# Patient Record
Sex: FEMALE
[De-identification: names, ages, dates, MRNs, and addresses within clinical notes are randomized per-mention and may not be internally consistent; named-entity substitution may affect disease eponyms.]

---

## 2017-02-22 ENCOUNTER — CHARTING TRANS (OUTPATIENT)
Dept: OTHER | Age: 62
End: 2017-02-22

## 2017-06-06 ENCOUNTER — APPOINTMENT (OUTPATIENT)
Dept: CT IMAGING | Facility: HOSPITAL | Age: 62
End: 2017-06-06
Attending: EMERGENCY MEDICINE
Payer: COMMERCIAL

## 2017-06-06 ENCOUNTER — HOSPITAL ENCOUNTER (EMERGENCY)
Facility: HOSPITAL | Age: 62
Discharge: HOME | End: 2017-06-06
Attending: EMERGENCY MEDICINE
Payer: COMMERCIAL

## 2017-06-06 VITALS
WEIGHT: 158 LBS | OXYGEN SATURATION: 96 % | SYSTOLIC BLOOD PRESSURE: 121 MMHG | BODY MASS INDEX: 29.08 KG/M2 | TEMPERATURE: 100 F | HEART RATE: 93 BPM | RESPIRATION RATE: 18 BRPM | DIASTOLIC BLOOD PRESSURE: 69 MMHG | HEIGHT: 62 IN

## 2017-06-06 DIAGNOSIS — K52.9 GASTROENTERITIS: Primary | ICD-10-CM

## 2017-06-06 DIAGNOSIS — N30.00 ACUTE CYSTITIS WITHOUT HEMATURIA: ICD-10-CM

## 2017-06-06 DIAGNOSIS — K42.9 UMBILICAL HERNIA WITHOUT OBSTRUCTION AND WITHOUT GANGRENE: ICD-10-CM

## 2017-06-06 PROCEDURE — 87045 FECES CULTURE AEROBIC BACT: CPT | Performed by: EMERGENCY MEDICINE

## 2017-06-06 PROCEDURE — 87086 URINE CULTURE/COLONY COUNT: CPT | Performed by: EMERGENCY MEDICINE

## 2017-06-06 PROCEDURE — 80053 COMPREHEN METABOLIC PANEL: CPT | Performed by: EMERGENCY MEDICINE

## 2017-06-06 PROCEDURE — 99284 EMERGENCY DEPT VISIT MOD MDM: CPT

## 2017-06-06 PROCEDURE — 81001 URINALYSIS AUTO W/SCOPE: CPT | Performed by: EMERGENCY MEDICINE

## 2017-06-06 PROCEDURE — 96361 HYDRATE IV INFUSION ADD-ON: CPT

## 2017-06-06 PROCEDURE — 87077 CULTURE AEROBIC IDENTIFY: CPT | Performed by: EMERGENCY MEDICINE

## 2017-06-06 PROCEDURE — 82272 OCCULT BLD FECES 1-3 TESTS: CPT | Performed by: EMERGENCY MEDICINE

## 2017-06-06 PROCEDURE — 87493 C DIFF AMPLIFIED PROBE: CPT | Performed by: EMERGENCY MEDICINE

## 2017-06-06 PROCEDURE — 87046 STOOL CULTR AEROBIC BACT EA: CPT | Performed by: EMERGENCY MEDICINE

## 2017-06-06 PROCEDURE — 87427 SHIGA-LIKE TOXIN AG IA: CPT | Performed by: EMERGENCY MEDICINE

## 2017-06-06 PROCEDURE — 85025 COMPLETE CBC W/AUTO DIFF WBC: CPT | Performed by: EMERGENCY MEDICINE

## 2017-06-06 PROCEDURE — 87186 SC STD MICRODIL/AGAR DIL: CPT | Performed by: EMERGENCY MEDICINE

## 2017-06-06 PROCEDURE — 96374 THER/PROPH/DIAG INJ IV PUSH: CPT

## 2017-06-06 PROCEDURE — 74176 CT ABD & PELVIS W/O CONTRAST: CPT | Performed by: EMERGENCY MEDICINE

## 2017-06-06 PROCEDURE — 87015 SPECIMEN INFECT AGNT CONCNTJ: CPT | Performed by: EMERGENCY MEDICINE

## 2017-06-06 PROCEDURE — 83690 ASSAY OF LIPASE: CPT | Performed by: EMERGENCY MEDICINE

## 2017-06-06 RX ORDER — CIPROFLOXACIN 500 MG/1
500 TABLET, FILM COATED ORAL 2 TIMES DAILY
Qty: 14 TABLET | Refills: 0 | Status: SHIPPED | OUTPATIENT
Start: 2017-06-06 | End: 2017-06-13

## 2017-06-06 RX ORDER — ONDANSETRON 2 MG/ML
4 INJECTION INTRAMUSCULAR; INTRAVENOUS ONCE
Status: COMPLETED | OUTPATIENT
Start: 2017-06-06 | End: 2017-06-06

## 2017-06-06 NOTE — ED PROVIDER NOTES
Patient Seen in: [facility] Emergency Department    History   Patient presents with:  Nausea/Vomiting/Diarrhea (gastrointestinal)    Stated Complaint: nvd hx stomach cancer    HPI    52-year-old female comes the hospital that she complained of havin Fluticasone Propionate (FLONASE) 50 MCG/ACT Nasal Suspension,  by Each Nare route daily.    omeprazole (PRILOSEC) 20 MG Oral Capsule Delayed Release,  Take 1 capsule by mouth every morning before breakfast.   Calcium Carbonate (CALCIUM 500 OR),  Take  by mo 96%        Physical Exam    HEENT : NCAT, EOMI, PEERL, throat clear, neck supple, no JVD, trachea midline, No LAD  Heart: S1S2 normal. No murmurs, regular rate and rhythm  Lungs: Clear to auscultation bilaterally  Abdomen: Soft nontender nondistended neda created for panel order STOOL CULTURE W/SHIGATOXIN.   Procedure                               Abnormality         Status                     ---------                               -----------         ------                     STOOL CULTURE(P)[[id number]] focal lesion.    BILIARY:  Normal.  No visible dilatation or calcification.    PANCREAS:  Normal.  No lesion, fluid collection, ductal dilatation, or atrophy.    SPLEEN:  Normal.  No enlargement or focal lesion.    KIDNEYS:  Normal.  No mass, obstruction, [address] [address] [phone]    Schedule an appointment as soon as possible for a visit in 2 days      Layne Heimlich, MD  [address] [place], [place] [id number]            Medications Prescribed:  Discharge M

## 2017-06-07 ENCOUNTER — HOSPITAL ENCOUNTER (OUTPATIENT)
Facility: HOSPITAL | Age: 62
Setting detail: OBSERVATION
Discharge: HOME | End: 2017-06-09
Attending: EMERGENCY MEDICINE | Admitting: HOSPITALIST
Payer: COMMERCIAL

## 2017-06-07 ENCOUNTER — APPOINTMENT (OUTPATIENT)
Dept: GENERAL RADIOLOGY | Facility: HOSPITAL | Age: 62
End: 2017-06-07
Attending: EMERGENCY MEDICINE
Payer: COMMERCIAL

## 2017-06-07 ENCOUNTER — APPOINTMENT (OUTPATIENT)
Dept: NUCLEAR MEDICINE | Facility: HOSPITAL | Age: 62
End: 2017-06-07
Attending: EMERGENCY MEDICINE
Payer: COMMERCIAL

## 2017-06-07 DIAGNOSIS — R19.7 DIARRHEA, UNSPECIFIED TYPE: Primary | ICD-10-CM

## 2017-06-07 DIAGNOSIS — E87.6 HYPOKALEMIA: ICD-10-CM

## 2017-06-07 DIAGNOSIS — R53.1 WEAKNESS GENERALIZED: ICD-10-CM

## 2017-06-07 PROBLEM — K52.9 GASTROENTERITIS: Status: ACTIVE | Noted: 2017-06-07

## 2017-06-07 PROBLEM — R73.9 HYPERGLYCEMIA: Status: ACTIVE | Noted: 2017-06-07

## 2017-06-07 PROCEDURE — 74020 XR ABDOMEN, OBSTRUCTIVE SERIES (CPT=74020): CPT | Performed by: EMERGENCY MEDICINE

## 2017-06-07 PROCEDURE — 78582 LUNG VENTILAT&PERFUS IMAGING: CPT | Performed by: EMERGENCY MEDICINE

## 2017-06-07 PROCEDURE — 71020 XR CHEST PA + LAT CHEST (CPT=71020): CPT | Performed by: EMERGENCY MEDICINE

## 2017-06-07 PROCEDURE — 99225 SUBSEQUENT OBSERVATION CARE: CPT | Performed by: HOSPITALIST

## 2017-06-07 RX ORDER — ACETAMINOPHEN 325 MG/1
650 TABLET ORAL EVERY 6 HOURS PRN
Status: DISCONTINUED | OUTPATIENT
Start: 2017-06-07 | End: 2017-06-09

## 2017-06-07 RX ORDER — ONDANSETRON 2 MG/ML
4 INJECTION INTRAMUSCULAR; INTRAVENOUS EVERY 4 HOURS PRN
Status: DISCONTINUED | OUTPATIENT
Start: 2017-06-07 | End: 2017-06-09

## 2017-06-07 RX ORDER — POTASSIUM CHLORIDE 20 MEQ/1
40 TABLET, EXTENDED RELEASE ORAL EVERY 4 HOURS
Status: COMPLETED | OUTPATIENT
Start: 2017-06-07 | End: 2017-06-08

## 2017-06-07 RX ORDER — ENOXAPARIN SODIUM 100 MG/ML
40 INJECTION SUBCUTANEOUS DAILY
Status: DISCONTINUED | OUTPATIENT
Start: 2017-06-07 | End: 2017-06-09

## 2017-06-07 RX ORDER — SODIUM CHLORIDE 9 MG/ML
1000 INJECTION, SOLUTION INTRAVENOUS ONCE
Status: COMPLETED | OUTPATIENT
Start: 2017-06-07 | End: 2017-06-07

## 2017-06-07 RX ORDER — ONDANSETRON 2 MG/ML
4 INJECTION INTRAMUSCULAR; INTRAVENOUS ONCE
Status: COMPLETED | OUTPATIENT
Start: 2017-06-07 | End: 2017-06-07

## 2017-06-07 RX ORDER — ONDANSETRON 2 MG/ML
4 INJECTION INTRAMUSCULAR; INTRAVENOUS EVERY 6 HOURS PRN
Status: DISCONTINUED | OUTPATIENT
Start: 2017-06-07 | End: 2017-06-09

## 2017-06-07 RX ORDER — LOPERAMIDE HYDROCHLORIDE 2 MG/1
2 CAPSULE ORAL 4 TIMES DAILY PRN
Status: DISCONTINUED | OUTPATIENT
Start: 2017-06-07 | End: 2017-06-09

## 2017-06-07 RX ORDER — POTASSIUM CHLORIDE 20 MEQ/1
20 TABLET, EXTENDED RELEASE ORAL ONCE
Status: COMPLETED | OUTPATIENT
Start: 2017-06-07 | End: 2017-06-07

## 2017-06-07 RX ORDER — DICYCLOMINE HYDROCHLORIDE 10 MG/ML
10 INJECTION INTRAMUSCULAR EVERY 6 HOURS PRN
Status: DISCONTINUED | OUTPATIENT
Start: 2017-06-07 | End: 2017-06-09

## 2017-06-07 RX ORDER — SODIUM CHLORIDE 9 MG/ML
INJECTION, SOLUTION INTRAVENOUS CONTINUOUS
Status: DISCONTINUED | OUTPATIENT
Start: 2017-06-07 | End: 2017-06-09

## 2017-06-07 RX ORDER — FLUTICASONE PROPIONATE 50 MCG
1 SPRAY, SUSPENSION (ML) NASAL DAILY
Status: DISCONTINUED | OUTPATIENT
Start: 2017-06-07 | End: 2017-06-09

## 2017-06-07 RX ORDER — DIPHENOXYLATE HYDROCHLORIDE AND ATROPINE SULFATE 2.5; .025 MG/1; MG/1
1 TABLET ORAL 4 TIMES DAILY PRN
Status: DISCONTINUED | OUTPATIENT
Start: 2017-06-07 | End: 2017-06-09

## 2017-06-07 RX ORDER — DICYCLOMINE HYDROCHLORIDE 10 MG/ML
20 INJECTION INTRAMUSCULAR ONCE
Status: COMPLETED | OUTPATIENT
Start: 2017-06-07 | End: 2017-06-07

## 2017-06-07 RX ORDER — LOPERAMIDE HYDROCHLORIDE 2 MG/1
2 CAPSULE ORAL 4 TIMES DAILY PRN
Status: DISCONTINUED | OUTPATIENT
Start: 2017-06-07 | End: 2017-06-07

## 2017-06-07 RX ORDER — SODIUM CHLORIDE 9 MG/ML
INJECTION, SOLUTION INTRAVENOUS CONTINUOUS
Status: ACTIVE | OUTPATIENT
Start: 2017-06-07 | End: 2017-06-07

## 2017-06-07 RX ORDER — METOCLOPRAMIDE HYDROCHLORIDE 5 MG/ML
10 INJECTION INTRAMUSCULAR; INTRAVENOUS EVERY 8 HOURS PRN
Status: DISCONTINUED | OUTPATIENT
Start: 2017-06-07 | End: 2017-06-09

## 2017-06-07 NOTE — PROGRESS NOTES
NURSING ADMISSION NOTE      Patient admitted via Wheelchair  Oriented to room. Safety precautions initiated. Bed in low position. Call light in reach.     Patient alert and oriented x4; maintaining O2 sats >93% on room air; abd soft; c/o diarrhea; pr

## 2017-06-07 NOTE — PLAN OF CARE
NURSING ADMISSION NOTE      Patient admitted via Cart  Oriented to room. Safety precautions initiated. Bed in low position. Call light in reach.     Admit Navigators completed   Report given to Critical access hospital

## 2017-06-07 NOTE — ED PROVIDER NOTES
Patient Seen in: [facility] Emergency Department    History   Patient presents with:  Abdomen/Flank Pain (GI/)  Nausea/Vomiting/Diarrhea (gastrointestinal)    Stated Complaint: ABD PAIN, DIARRHEA    HPI    Patient is a 67-year-old female who prese disorders, unspecified    • Cervicalgia    • Subjective tinnitus    • Other malaise and fatigue    • Mastodynia    • Pain in joint, hand    • Other malaise and fatigue    • Benign paroxysmal positional vertigo    • ASCUS (atypical squamous cells of undeter quit 30 years ago    Alcohol Use: No                 Comment: 4-5 beers 2x/month      Review of Systems    Positive for stated complaint: ABD PAIN, DIARRHEA  Other systems are as noted in HPI. Constitutional and vital signs reviewed.       All other system deficits appreciated. Cranial nerves II through XII are intact. Patient answering all questions appropriately.           ED Course     Labs Reviewed   COMP METABOLIC PANEL (14) - Abnormal; Notable for the following:     Glucose 139 (*)     AST 14 (*)     P ventilation/perfusion scan.       Patient had IV line established no blood work drawn including a CBC, chemistries, BUN/creatinine, and blood sugar all of which were essentially unremarkable except for potassium 3.3 for which the patient was given oral pota

## 2017-06-07 NOTE — H&P
[facility]  History and Physical     Critical access hospital Patient Status:  Observation    1955 MRN [id number]   [facility] 5NW-A Attending Radha Gomez DO   Hosp Day # 0 PCP Ju Stapleton MD     Chief Complaint: Abdominal radha quit smoking. Her smoking use included Cigarettes. She has never used smokeless tobacco. She reports that she does not drink alcohol or use illicit drugs.     Family History:   Family History   Problem Relation Age of Onset   • Breast Cancer Paternal Aunt SpO2 96%  General: No acute distress. Alert and oriented x 3. HEENT: Normocephalic atraumatic. Moist mucous membranes. EOM-I. PERRLA. Anicteric. Neck: No JVD. No carotid bruits. Respiratory: Clear to auscultation bilaterally. No wheezes. No rhonchi.   C

## 2017-06-08 PROCEDURE — 99225 SUBSEQUENT OBSERVATION CARE: CPT | Performed by: HOSPITALIST

## 2017-06-08 RX ORDER — PANTOPRAZOLE SODIUM 40 MG/1
40 TABLET, DELAYED RELEASE ORAL
Status: DISCONTINUED | OUTPATIENT
Start: 2017-06-09 | End: 2017-06-09

## 2017-06-08 NOTE — PROGRESS NOTES
[facility]  Progress Note     Jatin Barnes Patient Status:  Observation    1955 MRN [id number]   [facility] 5NW-A Attending Theodore Figueroa, [address] [facility] [place] Day # 1 PCP Jethro Jarquin MD     Chief Complaint: Diarrhea    S: Patient Propionate  1 spray Each Nare Daily   • enoxaparin  40 mg Subcutaneous Daily   • pantoprazole (PROTONIX) IV push  40 mg Intravenous Q24H   • cefTRIAXone  1 g Intravenous Q24H       ASSESSMENT / PLAN:     1.  Abdominal pain/diarrhea presumed secondary to gas

## 2017-06-08 NOTE — PLAN OF CARE
GASTROINTESTINAL - ADULT    • Maintains or returns to baseline bowel function Not Progressing          GASTROINTESTINAL - ADULT    • Minimal or absence of nausea and vomiting Progressing        METABOLIC/FLUID AND ELECTROLYTES - ADULT    • Electrolytes madalyn

## 2017-06-08 NOTE — CM/SW NOTE
06/08/17 1500   CM/SW Screening   Referral Source Social Work (self-referral);    [facility] staff; Chart review;Nursing rounds   Patient's Mental Status Alert;Oriented   Patient lives with Spouse   Patient Status Prior to Admissio

## 2017-06-08 NOTE — PROGRESS NOTES
[facility] Pharmacy Note: Route Optimization for Pantoprazole (PROTONIX)    Patient is currently on Pantoprazole (PROTONIX) 40 mg IV every 24 hours.    The patient meets the criteria to convert to the oral equivalent as established by the IV to Oral conversion pro

## 2017-06-08 NOTE — PAYOR COMM NOTE
--------------  ADMISSION REVIEW     Payor: [employer]/HMO/POS/EPO  Authorization Number: N/A    Order Requisition      Place in observation Once (Order #[id number]) on 6/7/17         Admit date: 6/7/2017  6:25 AM       Admitting Physician: Mello Willoughby morning and felt like she cannot take a deep breath this morning which she attributed to her abdominal discomfort. Patient denies history of any fever. Patient denies history of any recent travel or recent antibiotic use.   Patient states that she was sco There is no JVD. No meningeal signs or nuchal rigidity appreciated. No stridor. LUNGS: Clear to auscultation bilaterally with no wheeze. There is good equal air entry bilaterally. HEART: Regular rhythm with a mildly tachycardic rate.  Normal S1, S2 no S3, established no blood work drawn including a CBC, chemistries, BUN/creatinine, and blood sugar all of which were essentially unremarkable except for potassium 3.3 for which the patient was given oral potassium here in the emergency room.   Patient liver func No carotid bruits. Respiratory: Clear to auscultation bilaterally. No wheezes. No rhonchi. Cardiovascular: S1, S2. Regular rate and rhythm. No murmurs, rubs or gallops. Chest and Back: No tenderness or deformity.   Abdomen: Soft, mild epigastric tendern Dose Route User    6/7/2017 2110 Given 1 mL Intramuscular (Left Deltoid) Vamsi Salamanca RN      Enoxaparin Sodium (LOVENOX) 40 MG/0.4ML injection 40 mg     Date Action Dose Route User    6/7/2017 1230 Given 40 mg Subcutaneous (Left Lower Abdomen) [facility]

## 2017-06-09 VITALS
BODY MASS INDEX: 29.22 KG/M2 | TEMPERATURE: 99 F | RESPIRATION RATE: 20 BRPM | OXYGEN SATURATION: 92 % | SYSTOLIC BLOOD PRESSURE: 117 MMHG | DIASTOLIC BLOOD PRESSURE: 63 MMHG | WEIGHT: 158.81 LBS | HEIGHT: 62 IN | HEART RATE: 81 BPM

## 2017-06-09 PROCEDURE — 99217 OBSERVATION CARE DISCHARGE: CPT | Performed by: HOSPITALIST

## 2017-06-09 RX ORDER — PANTOPRAZOLE SODIUM 40 MG/1
40 TABLET, DELAYED RELEASE ORAL
Qty: 30 TABLET | Refills: 0 | Status: SHIPPED | OUTPATIENT
Start: 2017-06-09

## 2017-06-09 RX ORDER — LOPERAMIDE HYDROCHLORIDE 2 MG/1
2 CAPSULE ORAL 4 TIMES DAILY PRN
Qty: 30 CAPSULE | Refills: 0 | Status: SHIPPED | OUTPATIENT
Start: 2017-06-09

## 2017-06-09 RX ORDER — DIPHENOXYLATE HYDROCHLORIDE AND ATROPINE SULFATE 2.5; .025 MG/1; MG/1
1 TABLET ORAL 4 TIMES DAILY PRN
Qty: 30 TABLET | Refills: 0 | Status: SHIPPED | OUTPATIENT
Start: 2017-06-09

## 2017-06-09 NOTE — DISCHARGE SUMMARY
[facility] [place] [facility]  DISCHARGE SUMMARY     Chaparro Schroeder Patient Status:  Observation    1955 MRN [id number]   [facility] 5NW-A Attending Elizabeth Benoit, [address] [facility] Day # 2 PCP Marisa Jaeger MD     Date of Admission: 2017  Date o treated for UTI with rocephin which also covered aeromonas. Patient clinically improved with IVF and anti-diarrheals. Patient to continue cipro at home x 3 more days. Patient discharged home in good condition.      Procedures during hospitalization:   • Non OR        Take  by mouth. Refills:  0       Vitamin B-12 1000 MCG Tabs   Commonly known as:  VITAMIN B12        Take 1,000 mcg by mouth twice a week. Refills:  0       Vitamin D 2000 units Caps        Take  by mouth.     Refills:  0            Where t

## 2017-06-09 NOTE — PLAN OF CARE
NURSING DISCHARGE NOTE    Discharged Home via Ambulatory. Accompanied by Spouse  Belongings Taken by patient/family. PIV removed. Discharge instructions reviewed with patient. MD note & prescriptions sent with patient. Discharge navigator complete.

## 2017-06-09 NOTE — PROGRESS NOTES
[facility] [address]  [place], [address]  ?  06/09/2017  ? Re: Edgardo Millard  ? To Whom It May Concern: Edgardo Millard was admitted to [facility] from 6/7/2017 to 06/09/2017.     Please excuse Edgardo Millard from attendin

## 2017-06-12 ENCOUNTER — TELEPHONE (OUTPATIENT)
Dept: FAMILY MEDICINE CLINIC | Facility: CLINIC | Age: 62
End: 2017-06-12

## 2017-06-12 NOTE — TELEPHONE ENCOUNTER
Lmtcb, when patient calls back will ask for condition update post hospitalization and need for follow up appointment

## 2017-06-14 NOTE — TELEPHONE ENCOUNTER
Patient has not responded to telephone calls for follow up post hospitalization, letter was sent.  This encounter will be closed

## 2018-02-28 ENCOUNTER — CHARTING TRANS (OUTPATIENT)
Dept: OTHER | Age: 63
End: 2018-02-28

## 2018-04-26 ENCOUNTER — MED REC SCAN ONLY (OUTPATIENT)
Dept: FAMILY MEDICINE CLINIC | Facility: CLINIC | Age: 63
End: 2018-04-26

## 2018-11-01 VITALS
DIASTOLIC BLOOD PRESSURE: 88 MMHG | TEMPERATURE: 100.3 F | OXYGEN SATURATION: 95 % | HEART RATE: 102 BPM | BODY MASS INDEX: 29.44 KG/M2 | HEIGHT: 62 IN | SYSTOLIC BLOOD PRESSURE: 154 MMHG | WEIGHT: 160 LBS

## 2018-11-05 VITALS
WEIGHT: 160.49 LBS | TEMPERATURE: 97.9 F | SYSTOLIC BLOOD PRESSURE: 150 MMHG | HEART RATE: 100 BPM | RESPIRATION RATE: 16 BRPM | HEIGHT: 62 IN | DIASTOLIC BLOOD PRESSURE: 80 MMHG | BODY MASS INDEX: 29.53 KG/M2

## 2023-03-30 ENCOUNTER — TELEPHONE (OUTPATIENT)
Dept: FAMILY MEDICINE CLINIC | Facility: CLINIC | Age: 68
End: 2023-03-30

## 2023-04-03 ENCOUNTER — PATIENT OUTREACH (OUTPATIENT)
Dept: CASE MANAGEMENT | Age: 68
End: 2023-04-03

## 2023-04-03 NOTE — PROCEDURES
The office order for PCP removal request is Approved and finalized on April 3, 2023.     Thanks,  [facility] [employer]

## (undated) NOTE — ED AVS SNAPSHOT
[facility] Emergency Department    [place]  [address] [address]    Phone:  [phone]    Fax:  [address] [facility]   MRN: [id number]    Department:  [facility] Emergency Department   Date of Visit:  6/6/ Take 1 tablet (500 mg total) by mouth 2 (two) times daily.             Where to Get Your Medications      You can get these medications from any pharmacy     Bring a paper prescription for each of these medications    - Ciprofloxacin HCl 500 MG Tabs primary care or specialist physician will see patients referred from the [facility] Emergency Department. Follow-up care is at the discretion of that Physician.     IF THERE IS ANY CHANGE OR WORSENING OF YOUR CONDITION, CALL YOUR PRIMARY CARE PHYSICIAN harming yourself, contact [facility] at [phone]. - If you don’t have insurance, Lisa Jackson has partnered with Patient [address] to help you get signed up for insurance coverage.   Patient [place] SPLEEN:  Normal.  No enlargement or focal lesion. KIDNEYS:  Normal.  No mass, obstruction, or calcification. ADRENALS:  Normal.  No mass or enlargement. AORTA/VASCULAR:  Normal.  No aneurysm. RETROPERITONEUM:  Normal.  No mass or adenopathy.

## (undated) NOTE — IP AVS SNAPSHOT
[facility] [place] [address] [place], [address] ~ [phone]                Discharge Summary   6/7/2017    Jatin Barnes           Admission Information        Provider Department    6/7/2017 DO Mayito Alfonso 5nw-A         Victor Hugo Gonzalez Take 1 tablet (40 mg total) by mouth every morning before breakfast.    Verle Fraction taking these medications        Instructions Authorizing Provider    Morning Afternoon Evening As Needed    ALLEGRA 180 MG Tabs   Ge 41.1 (06/07/17)  87.1 -- -- -- (06/07/17)  321.0 --    (06/06/17)  9.0 (06/06/17)  4.79 (06/06/17)  14.0 (06/06/17)  41.5 (06/06/17)  86.6    (06/06/17)  322.0       Recent Hematology Lab Results (cont.)  (Last 3 results in the past 90 days)    Ne[facility]il Patient [address] to help you get signed up for insurance coverage. Patient [address] is a Federal Navigator program that can help with your Affordable Care Act coverage, as well as all types of Medicaid plans.   To get signed up and covere diarrhea           Blood Producing Medications     Vitamin B-12 (VITAMIN B12) 1000 MCG Oral Tab       Use: Increase blood cell counts   Most common side effects: Pain, fever, rash, fatigue, joint pain, blood clots, high blood pressure   What to report to y

## (undated) NOTE — Clinical Note
06/14/2017    Dr Jewell Rooney was made aware of your recent hospitalization and discharge and would like you     To follow up with an appointment as soon as possible, if Dr Jewell Mccormick is no longer your Doctor,    Please call our office and let us know

## (undated) NOTE — ED AVS SNAPSHOT
[facility] Emergency Department    [place]  [address] [address]    Phone:  [phone]    Fax:  [address] [facility]   MRN: [id number]    Department:  [facility] Emergency Department   Date of Visit:  6/6/ IF THERE IS ANY CHANGE OR WORSENING OF YOUR CONDITION, CALL YOUR PRIMARY CARE PHYSICIAN AT ONCE OR RETURN IMMEDIATELY TO THE EMERGENCY DEPARTMENT.     If you have been prescribed any medication(s), please fill your prescription right away and begin taking t